# Patient Record
Sex: MALE | Race: BLACK OR AFRICAN AMERICAN | NOT HISPANIC OR LATINO | Employment: OTHER
[De-identification: names, ages, dates, MRNs, and addresses within clinical notes are randomized per-mention and may not be internally consistent; named-entity substitution may affect disease eponyms.]

---

## 2023-05-19 ENCOUNTER — TRANSCRIBE ORDERS (OUTPATIENT)
Dept: OTHER | Age: 68
End: 2023-05-19

## 2023-05-19 DIAGNOSIS — E11.9 TYPE 2 DIABETES MELLITUS WITHOUT COMPLICATION (H): ICD-10-CM

## 2023-05-19 DIAGNOSIS — H52.4 PRESBYOPIA: Primary | ICD-10-CM

## 2023-05-19 DIAGNOSIS — Z86.73 HISTORY OF STROKE: ICD-10-CM

## 2023-09-18 ENCOUNTER — THERAPY VISIT (OUTPATIENT)
Dept: OCCUPATIONAL THERAPY | Facility: CLINIC | Age: 68
End: 2023-09-18
Attending: OPTOMETRIST
Payer: COMMERCIAL

## 2023-09-18 DIAGNOSIS — I63.9 STROKE (H): ICD-10-CM

## 2023-09-18 DIAGNOSIS — H52.4 PRESBYOPIA: Primary | ICD-10-CM

## 2023-09-18 PROCEDURE — 97165 OT EVAL LOW COMPLEX 30 MIN: CPT | Mod: GO | Performed by: OCCUPATIONAL THERAPIST

## 2023-09-18 PROCEDURE — 97535 SELF CARE MNGMENT TRAINING: CPT | Mod: GO | Performed by: OCCUPATIONAL THERAPIST

## 2023-09-18 NOTE — PROGRESS NOTES
OCCUPATIONAL THERAPY EVALUATION  Type of Visit: Evaluation    See electronic medical record for Abuse and Falls Screening details.    Subjective      Presenting condition or subjective complaint: vision  Date of onset: 23    Relevant medical history: -- (PMH: Presbyopia,stroke 2019 with L sided visual deficits, DM, PE, retinal detachment surgery, etc.)   Dates & types of surgery:      Prior diagnostic imaging/testing results:       Prior therapy history for the same diagnosis, illness or injury: No      Prior Level of Function - prior to stroke in 2019:  Transfers: Independent  Ambulation: Independent  ADL: Independent  IADL: Driving, Finances, Housekeeping, Laundry, Meal preparation, Medication management, Work    Living Environment  Social support: With a significant other or spouse (s/o Niru who is also his PCA stays sometimes)   Type of home: Apartment/condo   Stairs to enter the home:         Ramp:     Stairs inside the home:         Help at home: Self Cares (home health aide/personal care attendant, family, etc); Assist for driving and community activities; Home management tasks (cooking, cleaning); Medication and/or finances  Equipment owned:       Employment: No [employer] and other [employer]", etc.;  of [employer] in [place]; president of [employer]; from [facility]; worked up until he had his stroke  Hobbies/Interests: can't do anymore since stroke,would like to be more active and being able to speak motivationally again, etc.    Patient goals for therapy: read - especially his bible, be able to get in front of people again and speak (he is a )    Pain assessment: Pain present  Location: R foot/Ratin/10  Location: tooth pain/Ratin/10     Objective   LOW VISION EVALUATION  ADDITIONAL HISTORY:  Current Responsibilities - IADLS priot to stroke: Driving, Finances, Housekeeping, Laundry, Meal preparation, Medication  "management, Work    Others present at visit: Spouse / significant other    COGNITIVE/BEHAVIORAL:  Communication: Intact  Cognitive Status: Oriented to person, place and time  Behavior: Appropriate    Physical Status/Equipment   Physical Status: s/o guiding patient with elbow/hand; needing some help with ADLs as well - gets clothes turned around, etc.  Mobility Equipment Used: None, also has a walker - uses when s/o busy, otherwise relies on s/o to help with mobility (hand hold)  Bathing ADL Equipment Used:  none  Toileting ADL Equipment Used:  none    VISUAL REPORT:  Functional complaints: Avocational tasks, Homemaking, Leisure, Reading, Safety in mobility, Work related tasks, Writing  Visual Complaints: Constricted visual fields right eye, Constricted visual fields left eye, Visual fatigue, Difficulty maintaining focus, Light sensitivity  Kurt Bonnet Symptoms? No   Magnifiers and Low Vision AE owned:  has one but not using  Reading Glasses: Bifocal (lined) - not liking much  Power per MD report:  +2.75  Technology: Smart phone, not using computer much after stroke - harder, would like to    LIGHTING AND GLARE:  Is your lighting adequate? Yes at home  Is glare a problem? Yes outdoors  Are you satisfied with your sunglasses? Yes   Sunglass Details:  medium and dark gray    VISUAL ACUITY:  Distance Acuity Right Eye: Per recent MD report, 20/200  Distance Acuity Left Eye: Per recent MD report, 20/50 -2  Distance Acuity Both Eyes Together:  not provided  Near Visual Acuity:  see below    CONTRAST SENSITIVITY:  Contrast Sensitivity (score/25): 22/25 (cues to look all the way to the left)    PREFERRED RETINAL LOCUS:  Right/Left Eye:  see below - central visual field    MN Read  Smallest Print Size Read: at 16\" and without Rx (no Rx now and not sure of SVR OTR power): .8M ambient light, .5M (-3) with cool, bright task light, at any distance (~10\") and with +2.5 readers: .4M  Critical Print Size: inconsistent throughout, " likely closest to .6M with task light  Words per minute at critical print size: 67wpm    Visual Skills for Reading  NT/to be assessed    Visual Field:  Central Visual Field: Abnormal  Central Visual Field Screen Used: Clock Face, Red Dot Confrontation Test, clock face: patient reported able to see all; Red Dot: missing targets in all of L visual field, saw both targets (superior/inferior) in center  Peripheral Visual Field: Further testing recommended  Peripheral Visual Field Screen Used:  N/A    Visual Attention: Further testing recommended    SRAFVP Scores:  Relevant Measures: 33  Composite Score: 37  Percentage of Disability: 43.94    Assessment & Plan   CLINICAL IMPRESSIONS  Medical Diagnosis: Presbyopia (H52.4)  - Primary, Type 2 diabetes mellitus without complication (H) (E11.9), History of stroke (Z86.73)    Treatment Diagnosis: decreased ADL/IADL independence    Impression/Assessment: Pt is a 67 year old male presenting to Occupational Therapy due to low vision.  The following significant findings have been identified: Impaired mobility and Impaired visual perception.  These identified deficits interfere with their ability to perform self care tasks, work tasks, recreational activities, household chores, driving , household mobility, community mobility, medication management, financial management,  yard work, meal planning and preparation, and community or volunteer activities as compared to previous level of function.     Clinical Decision Making (Complexity):  Assessment of Occupational Performance: 5 or more Performance Deficits  Occupational Performance Limitations: bathing/showering, dressing, feeding, functional mobility, driving and community mobility, health management and maintenance, home establishment and management, meal preparation and cleanup, shopping, work, volunteer activities, and leisure activities  Clinical Decision Making (Complexity): Low complexity    PLAN OF CARE  Treatment  Interventions:  Interventions: Self-Care/Home Management, Therapeutic Activity    Long Term Goals   OT Goal 1  Goal Identifier: Near Vision  Goal Description: Patient will demonstrate 3 pieces of adaptive equipment and/or adaptive techniques in regards to scanning, magnification, and glare for increased ADL/IADL independence with near vision tasks (reading, meal prep, medication management, writing).  Rationale: In order to maximize safety and independence with performance of self-care activities;In order to safely and appropriately apply compensatory strategies with ADL/IADL performance  Target Date: 03/14/24  OT Goal 2  Goal Identifier: Environmental modifications  Goal Description: Patient will demonstrate and/or verbalize 3 environmentally-based ADL [id number] to improve visual-based ADL/IADL activities.  Rationale: In order to maximize safety and independence with performance of self-care activities;In order to safely and appropriately apply compensatory strategies with ADL/IADL performance  Target Date: 03/14/24  OT Goal 3  Goal Identifier: Resource Education  Goal Description: Patient will verbalize awareness of 2 community resources for increased ADL/IADL completion.  Rationale: In order to maximize safety and independence with performance of self-care activities  Target Date: 03/14/24  OT Goal 4  Goal Identifier: Distance Viewing and safe functional mobility  Goal Description: Patient will demonstrate increased independence in distance viewing and functional mobility/navigation for safety and leisure during ADL/IADLs through independent use of at least one adaptive technique or AE.  Rationale: In order to maximize safety and independence with performance of self-care activities;In order to safely and appropriately apply compensatory strategies with ADL/IADL performance  Target Date: 03/14/24      Frequency of Treatment: 1x/week, decreasing frequency as indicated  Duration of Treatment: 6 months (extended  due to potential scheduling conflicts)     Recommended Referrals to Other Professionals:  to be determined, reports has therapy after his stroke, but not vision  Education Assessment: Learner/Method: Patient;Caregiver;Significant Other;Listening  Education Comments: scanning strategies (look to the left) for near and extrapersonal space; large print best with good task lighting for now; +2.50 readers appeared best for clarity with reading; educated that there are Visual field requirements for state of [place] - not sure if he meets that but this is most important indicator to start to see his chances of returning to community mobility/operating motor vehicle     Risks and benefits of evaluation/treatment have been explained.   Patient/Family/caregiver agrees with Plan of Care.     Evaluation Time:    OT Eval, Low Complexity Minutes (49246): 40    Signing Clinician: ANAHI Sanchez [facility]                                                                                   OUTPATIENT OCCUPATIONAL THERAPY      PLAN OF TREATMENT FOR OUTPATIENT REHABILITATION   Patient's Last Name, First Name, KBBolivarSUMMERXavier Hogue YOB: 1955   Provider's Name   [facility]   Medical Record No.  [id number]     Onset Date: 05/19/23 Start of Care Date: 09/18/23     Medical Diagnosis:  Presbyopia (H52.4)  - Primary, Type 2 diabetes mellitus without complication (H) (E11.9), History of stroke (Z86.73)      OT Treatment Diagnosis:  decreased ADL/IADL independence Plan of Treatment  Frequency/Duration:1x/week, decreasing frequency as indicated/6 months (extended due to potential scheduling conflicts)    Certification date from 09/18/23   To 12/16/23        See note for plan of treatment details and functional goals     Darcie Hess OT                         I CERTIFY THE NEED FOR THESE SERVICES FURNISHED UNDER        THIS PLAN OF TREATMENT AND WHILE UNDER MY CARE  .             Physician Signature               Date    X_____________________________________________________                    Referring Provider:  Jd Ruffin      Initial Assessment  See Epic Evaluation- 09/18/23

## 2024-01-10 ENCOUNTER — THERAPY VISIT (OUTPATIENT)
Dept: OCCUPATIONAL THERAPY | Facility: CLINIC | Age: 69
End: 2024-01-10
Attending: OPTOMETRIST
Payer: COMMERCIAL

## 2024-01-10 DIAGNOSIS — H52.4 PRESBYOPIA: Primary | ICD-10-CM

## 2024-01-10 PROCEDURE — 97535 SELF CARE MNGMENT TRAINING: CPT | Mod: GO | Performed by: OCCUPATIONAL THERAPIST

## 2024-01-10 NOTE — PROGRESS NOTES
01/10/24 0500   Appointment Info   Treating Provider Darcie Hess, OTR/L   Total/Authorized Visits 2/10 - [facility] [place]   Medical Diagnosis Presbyopia (H52.4)  - Primary, Type 2 diabetes mellitus without complication (H) (E11.9), History of stroke (Z86.73)   OT Tx Diagnosis decreased ADL/IADL independence   Precautions/Limitations falls due to low vision and possibly balance as well   Quick Add  Certification   Progress Note/Certification   Start Of Care Date 09/18/23   Onset of Illness/Injury or Date of Surgery 05/19/23   Therapy Frequency 1x/week, decreasing frequency as indicated   Predicted Duration 6 months (extended due to potential scheduling conflicts)   Certification date from 12/17/23   Certification date to 03/14/24   Progress Note Due Date 03/14/24   Progress Note Completed Date 09/18/23   Goals   OT Goals 1;2;3;4   OT Goal 1   Goal Identifier Near Vision   Goal Description Patient will demonstrate 3 pieces of adaptive equipment and/or adaptive techniques in regards to scanning, magnification, and glare for increased ADL/IADL independence with near vision tasks (reading, meal prep, medication management, writing).   Rationale In order to maximize safety and independence with performance of self-care activities;In order to safely and appropriately apply compensatory strategies with ADL/IADL performance   Target Date 03/14/24   OT Goal 2   Goal Identifier Environmental modifications   Goal Description Patient will demonstrate and/or verbalize 3 environmentally-based ADL [id number] to improve visual-based ADL/IADL activities.   Rationale In order to maximize safety and independence with performance of self-care activities;In order to safely and appropriately apply compensatory strategies with ADL/IADL performance   Target Date 03/14/24   OT Goal 3   Goal Identifier Resource Education   Goal Description Patient will verbalize awareness of 2 community resources for increased ADL/IADL  completion.   Rationale In order to maximize safety and independence with performance of self-care activities   Target Date 03/14/24   OT Goal 4   Goal Identifier Distance Viewing and safe functional mobility   Goal Description Patient will demonstrate increased independence in distance viewing and functional mobility/navigation for safety and leisure during ADL/IADLs through independent use of at least one adaptive technique or AE.   Rationale In order to maximize safety and independence with performance of self-care activities;In order to safely and appropriately apply compensatory strategies with ADL/IADL performance   Target Date 03/14/24   Subjective Report   Subjective Report Patient got new glasses since last seen but still can't read small print. Only wears them when trying to read small print and needs to hold them up some to look through the bifocal. Also asking about driving - if he could drive with his current vision.   Objective Measures   Objective Measures Objective Measure 1;Objective Measure 2   Objective Measure 1   Objective Measure Peripheral Visual field via Bernell Vision Disc   Details R eye: 85 degrees temporal visual and ~5 degrees nasal visual field, L eye: ~5 degrees temporal visual field and 50 degrees nasal visual field. Results suggest L visual field deficit.   Objective Measure 2   Objective Measure MNRead   Details with refraction: using ambient lighting: Smallest print size read: .5 M; with task light on chart (warm and dim): .4M with 2 errors; Critical Print size: .8M; WPM at critical print size: 150;  Preferred print size: 1.3M.  Normal reading speed for normal reader: 150-200 wpm out loud.   Treatment Interventions (OT)   Interventions Self Care/Home Management   Self Care/Home Management   Self-Care/Home Mgmt/ADL, Compensatory, Meal Prep Minutes (22531) 61 Minutes   Self Care 1 Adaptive equipment and adapted strategies to maximize visual based ADLs/IADLs   Skilled Intervention  Re-test of MNREad with new Rx - see above. See visual field screening above - educated patient and caregiver in results and application to daily skills. Please see education section below for details of all areas of education completed today including education in AE and adapted techniques to increase visibility for ADL/IADLs.  The most successful techniques and AE are listed below.  Patient demonstrated and/or verbalized use of all of the adapated techniques and AE below via teach back with min to mod cues after increased time/practice.   Patient Response/Progress progress in goals 1,2,3   Education   Learner/Method Patient;Caregiver;Significant Other;Listening   Education Comments educated in and trialed numerous suarez of readers and magnifiers, etc.: +3.0 readers optimal for clarity with reading - large print best but able to read .4M print size on MNRead, +4.0 optimal for very small print - .25M on MNRead chart (but shorter focal distance), 3x stand magnifier successful with his bifocals and lap desk; focal distance with all AE and glasses; ergonomics with low vision and AE; educated that there are Visual field requirements for state of [place] - per screen today, not very likely he has enough visual field to drive but strongly recommend he talk to his eye MD; [employer] and [employer] for readers, etc.; collaboration and education in POC, goals, etc. - all goals remain appropriate;  Look into Anti-slip Eyeglass nose pads for your glasses to help them stay up and be able to use your bifocal without having to lift them up   Plan   Home program anti-slip nose pads for glasses, +3.0 readers, consider lap desk   Updates to plan of care wants to do more motivational speaking and read bible, drive   Plan for next session (L VFD - appears complete, per MD L eye better [facility] but patient says R eye best); check hw; close SBA or s/o guides him; do Pepper Test?? (1.6M or 2M size) - low vision needs most focus; DV and  SC for baseline; start Simon sheets for near scanning; *cont. AE for reading (show desktop CCTV, 3x stand magnifier was successful); SSB referral?!!; HH magnification; tech adaptations (cell ph, computer - hasn't done in a long time); lighting; (22/25 CSF for contrast); dark colors/clothes; help more in kitchen (bump dots for microwave and other if he'll get back to using stove); TV AE; PBS and writing; ed. in extrapersonal scanning strategies and more DV and SC (close SBA/CGA); O&M?   Total Session Time   Timed Code Treatment Minutes 61   Total Treatment Time (sum of timed and untimed services) 61         [facility]                                                                                   OUTPATIENT OCCUPATIONAL THERAPY    PLAN OF TREATMENT FOR OUTPATIENT REHABILITATION   Patient's Last Name, First Name, KBBolivarSUMMERXavier Hogue YOB: 1955   Provider's Name   [facility]   Medical Record No.  [id number]     Onset Date: 05/19/23 Start of Care Date: 09/18/23     Medical Diagnosis:  Presbyopia (H52.4)  - Primary, Type 2 diabetes mellitus without complication (H) (E11.9), History of stroke (Z86.73)      OT Treatment Diagnosis:  decreased ADL/IADL independence Plan of Treatment  Frequency/Duration:1x/week, decreasing frequency as indicated/6 months (extended due to potential scheduling conflicts)    Certification date from 12/17/23   To 03/14/24        See note for plan of treatment details and functional goals.  Updates to goals will be in formal progress note - written at 10th visit or discharge, whichever comes sooner.  Patient only seen for 1 visit beyond evaluation thus far.    Darcie Hess, OT                         I CERTIFY THE NEED FOR THESE SERVICES FURNISHED UNDER        THIS PLAN OF TREATMENT AND WHILE UNDER MY CARE .             Physician Signature                Date    X_____________________________________________________                  Referring Provider:  Jd Ruffin    Initial Assessment  See Epic Evaluation- 09/18/23

## 2024-02-12 ENCOUNTER — THERAPY VISIT (OUTPATIENT)
Dept: OCCUPATIONAL THERAPY | Facility: CLINIC | Age: 69
End: 2024-02-12
Attending: OPTOMETRIST
Payer: COMMERCIAL

## 2024-02-12 DIAGNOSIS — H52.4 PRESBYOPIA: Primary | ICD-10-CM

## 2024-02-12 PROCEDURE — 97535 SELF CARE MNGMENT TRAINING: CPT | Mod: GO | Performed by: OCCUPATIONAL THERAPIST

## 2024-06-03 ENCOUNTER — THERAPY VISIT (OUTPATIENT)
Dept: OCCUPATIONAL THERAPY | Facility: CLINIC | Age: 69
End: 2024-06-03
Attending: OPTOMETRIST
Payer: COMMERCIAL

## 2024-06-03 DIAGNOSIS — H52.4 PRESBYOPIA: Primary | ICD-10-CM

## 2024-06-03 PROCEDURE — 97535 SELF CARE MNGMENT TRAINING: CPT | Mod: GO | Performed by: OCCUPATIONAL THERAPIST

## 2024-06-03 NOTE — PROGRESS NOTES
06/03/24 0500   Appointment Info   Treating Provider Darcie Hess OTR/L   Total/Authorized Visits 4/10 - [facility]   Medical Diagnosis Presbyopia (H52.4)  - Primary, Type 2 diabetes mellitus without complication (H) (E11.9), History of stroke (Z86.73)   OT Tx Diagnosis decreased ADL/IADL independence   Precautions/Limitations falls due to low vision and possibly balance as well   Quick Add  Certification   Progress Note/Certification   Start Of Care Date 09/18/23   Onset of Illness/Injury or Date of Surgery 05/19/23   Therapy Frequency 1x/week, decreasing frequency as indicated   Predicted Duration 6 months (extended due to potential scheduling conflicts)   Certification date from 03/15/24   Certification date to 06/12/24   Progress Note Due Date 11/28/24   Progress Note Completed Date 09/18/23   Goals   OT Goals 1;2;3;4   OT Goal 1   Goal Identifier Near Vision   Goal Description Patient will demonstrate 3 pieces of adaptive equipment and/or adaptive techniques in regards to scanning, magnification, and glare for increased ADL/IADL independence with near vision tasks (reading, meal prep, medication management, writing).   Rationale In order to maximize safety and independence with performance of self-care activities;In order to safely and appropriately apply compensatory strategies with ADL/IADL performance   Goal Progress Good progress towards goal; goal continues to be appropriate until fully met. Education provided in so far: AE for reading (reading guide, typoscope, 3x illuminated stand magnifier and CCTV  successful, magnifier angle on cell phone, etc.); also educated in scanning strategies to the left   Target Date 11/28/24   OT Goal 2   Goal Identifier Environmental modifications   Goal Description Patient will demonstrate and/or verbalize 3 environmentally-based ADL [id number] to improve visual-based ADL/IADL activities.   Rationale In order to maximize safety and independence with  performance of self-care activities;In order to safely and appropriately apply compensatory strategies with ADL/IADL performance   Goal Progress Goal not addressed as much due to other focus areas.  Education provided thus far: different color contrast options for CCTV's, etc. due to some light sensitivity - prefers yellow/black.   Target Date 11/28/24   OT Goal 3   Goal Identifier Resource Education   Goal Description Patient will verbalize awareness of 2 community resources for increased ADL/IADL completion.   Rationale In order to maximize safety and independence with performance of self-care activities   Goal Progress Good progress towards goal; goal continues to be appropriate until fully met. Education provided in so far: [employer], [employer]   Target Date 11/28/24   OT Goal 4   Goal Identifier Distance Viewing and safe functional mobility   Goal Description Patient will demonstrate increased independence in distance viewing and functional mobility/navigation for safety and leisure during ADL/IADLs through independent use of at least one adaptive technique or AE.   Rationale In order to maximize safety and independence with performance of self-care activities;In order to safely and appropriately apply compensatory strategies with ADL/IADL performance   Goal Progress Good progress towards goal; goal continues to be appropriate until fully met. Education provided in so far: extrapersonal scanning strategies to increase functional mobility   Target Date 11/28/24   Subjective Report   Subjective Report Patient reports he has been reading his bible and it's going better.  Still hasn't gotten +3.0 glasses - not sure what power his glasses are.  Reports did his homework.  Feels like he's paying more attention to his left - while pushing a grocery cart for example.  Asking about driving - feels like he's improving.   Objective Measures   Objective Measures Objective Measure 1;Objective  Measure 2;Objective Measure 3;Objective Measure 4   Objective Measure 1   Objective Measure Peripheral Visual field via Bernell Vision Disc   Details 1/10/24: R eye: 85 degrees temporal visual and ~5 degrees nasal visual field, L eye: ~5 degrees temporal visual field and 50 degrees nasal visual field. Results suggest L visual field deficit.   Objective Measure 2   Objective Measure MNRead   Details 1/10/24: with refraction: using ambient lighting: Smallest print size read: .5 M; with task light on chart (warm and dim): .4M with 2 errors; Critical Print size: .8M; WPM at critical print size: 150;  Preferred print size: 1.3M.  Normal reading speed for normal reader: 150-200 wpm out loud.   Objective Measure 3   Objective Measure Visual Skills for Reading Test (Pepper Test)   Details 2/12/24: Used 2.0M font size using Rx bifocals and cool task light.  Patient read the letters and words with 73% accuracy and read 49 wpm (average wpm for adults is 150-200 reading out loud).  Pattern of errors included: misidentification (mostly consistent with L field deficit, however, there were R sided errors as well), repetition (re-read parts or all of 5 of the lines), spells word (needed re-direction to say the words) and line skip (skipped 2 whole lines and 1/2 of 4 lines - 3 on the right and 1 on the L)   Objective Measure 4   Objective Measure Dynavision   Details 2/12/24: (All completed seated and majority of time using R UE only due to slightly decreased strength/coordination of L UE): Mode A: 36 hits (reaction time per quadrant per secl: 1.7 upper L, 1.6 upper R, 1.8 lower L and 1.5 lower R); trail 2: 46 hits and ~.3 sec. slower again lower L; educated in numerous strategies to compensate for L visual deficit and then completed a 3rd trial: 44 hits and quadrants nearly equal - improved   Treatment Interventions (OT)   Interventions Self Care/Home Management   Self Care/Home Management   Self-Care/Home Mgmt/ADL, Compensatory,  "Meal Prep Minutes (96021) 58 Minutes   Self Care 1 Adaptive equipment and adapted strategies to maximize visual based ADLs/IADLs   Skilled Intervention Please see education section below for details of all areas of education completed today including education in AE and adapted techniques to increase visibility for ADL/IADLs. The most successful techniques and AE are listed below. Patient demonstrated and/or verbalized use of all of the adapated techniques and AE below via teach back with min to mod cues (more cues for technology) after increased time/practice. Custom handout issued re: education completed today and for future reference.   Patient Response/Progress progress in goals 1,2,4   Education   Learner/Method Patient;Caregiver;Significant Other;Listening   Education Comments AE and adaptations for reading:  his reading glasses are better than the +3.0 as determined today, reading guide helpful; Magnifiers: 3x stand magnifier with light successful again, ergonomics/Lap desk brings it closer and need to use with the stand magnifier, CCTV - [employer] - educated in and trialed 8\"CCTV today and verbal education in other sizes/types - preferred yellow/black color contrast; cell phone adaptations: Stubmatic angle (like a magnifier) - educated in all features including color contrast; pop socket would be helpful to have if you use the Stubmatic angle more, bold font successful; [employer] as a resource to purchase AE; [employer] [facility] [employer] ([facility]) and services they offer; visual skills necessary to operate a motor vehicle   Plan   Home program NEW: WeZoom angle, get a pop socket, consider CCTV (likely no - [place]); CONT: reading guide and *typoscope! issued for reading large print bible; 4M Simon scanning sheets; circular scanning extrapersonal; anti-slip nose pads for glasses, +3.0 readers   Updates to plan of care wants to do more motivational speaking and read bible, drive   Plan for next session " "(L VFD - appears complete, per MD L eye better [place] but patient says R eye best); (did spatial inattn ed.); check hw, close SBA or s/o guides him; - low vision needs most focus; more DV prn - not necessary and SC for 1st time with circular (and need to teach lighthouse); cont. Simon sheets for near scanning; **SSB referral - educated in 6/3 for magnifiers and O&M?; HH magnification (on top of Wezoom mag); tech adaptations (cell ph - did font and we zoom so far - strong accent hard for voice, computer - hasn't done in a long time); lighting; (22/25 CSF for contrast); dark colors/clothes; help more in kitchen (bump dots for microwave and other if he'll get back to using stove); TV AE; PBS and writing; more trial of AE for cont. reading prn (showed 8\" CCTV, 3x stand magnifier - both successful); re-do Pepper Test (2M size) at last visit   Total Session Time   Timed Code Treatment Minutes 58   Total Treatment Time (sum of timed and untimed services) 58         [facility]                                                                                   OUTPATIENT OCCUPATIONAL THERAPY    PLAN OF TREATMENT FOR OUTPATIENT REHABILITATION   Patient's Last Name, First Name, Swapnil Weinbergdanna AGUIRRE YOB: 1955   Provider's Name   [facility]   Medical Record No.  [id number]     Onset Date: 05/19/23 Start of Care Date: 09/18/23     Medical Diagnosis:  Presbyopia (H52.4)  - Primary, Type 2 diabetes mellitus without complication (H) (E11.9), History of stroke (Z86.73)      OT Treatment Diagnosis:  decreased ADL/IADL independence Plan of Treatment  Frequency/Duration:1x/week, decreasing frequency as indicated/6 months (extended due to potential scheduling conflicts)    Certification date from (P) 03/15/24   To (P) 06/12/24        See note for plan of treatment details and functional goals     Darcie Hess, OT                         I CERTIFY THE NEED FOR " THESE SERVICES FURNISHED UNDER        THIS PLAN OF TREATMENT AND WHILE UNDER MY CARE .             Physician Signature               Date    X_____________________________________________________                  Referring Provider:  Jd Ruffin    Initial Assessment  See Epic Evaluation- 09/18/23          PLAN  Continue therapy per current plan of care.    Beginning/End Dates of Progress Note Reporting Period:  09/18/23 to 06/03/2024    Referring Provider:  Jd Ruffin

## 2024-06-11 ENCOUNTER — TRANSCRIBE ORDERS (OUTPATIENT)
Dept: OTHER | Age: 69
End: 2024-06-11

## 2024-06-11 DIAGNOSIS — I63.9 STROKE (H): Primary | ICD-10-CM

## 2024-06-11 DIAGNOSIS — H33.20 RETINAL DETACHMENT: ICD-10-CM

## 2024-08-12 ENCOUNTER — THERAPY VISIT (OUTPATIENT)
Dept: OCCUPATIONAL THERAPY | Facility: CLINIC | Age: 69
End: 2024-08-12
Attending: OPTOMETRIST
Payer: COMMERCIAL

## 2024-08-12 DIAGNOSIS — I63.9 STROKE (H): ICD-10-CM

## 2024-08-12 DIAGNOSIS — H52.4 PRESBYOPIA: Primary | ICD-10-CM

## 2024-08-12 PROCEDURE — 97535 SELF CARE MNGMENT TRAINING: CPT | Mod: GO | Performed by: OCCUPATIONAL THERAPIST

## 2024-08-12 NOTE — PROGRESS NOTES
08/12/24 0500   Appointment Info   Treating Provider Darcie Hess, OTR/L   Total/Authorized Visits 1/10 - [facility]   Medical Diagnosis Presbyopia (H52.4)  - Primary, Type 2 diabetes mellitus without complication (H) (E11.9), History of stroke (Z86.73)   OT Tx Diagnosis decreased ADL/IADL independence   Precautions/Limitations falls due to low vision and possibly balance as well   Progress Note/Certification   Start Of Care Date 09/18/23   Onset of Illness/Injury or Date of Surgery 05/19/23   Therapy Frequency 1x/week, decreasing frequency as indicated   Predicted Duration 6 months (extended due to potential scheduling conflicts)   Certification date from 06/13/24   Certification date to 09/10/24   Progress Note Due Date 11/28/24   Progress Note Completed Date 06/03/24   Goals   OT Goals 1;2;3;4   OT Goal 1   Goal Identifier Near Vision   Goal Description Patient will demonstrate 3 pieces of adaptive equipment and/or adaptive techniques in regards to scanning, magnification, and glare for increased ADL/IADL independence with near vision tasks (reading, meal prep, medication management, writing).   Rationale In order to maximize safety and independence with performance of self-care activities;In order to safely and appropriately apply compensatory strategies with ADL/IADL performance   Goal Progress Good progress towards goal; goal continues to be appropriate until fully met. Education provided in so far: AE for reading (reading guide, typoscope, 3x illuminated stand magnifier and CCTV  successful, magnifier angle on cell phone, etc.); also educated in scanning strategies to the left   Target Date 11/28/24   OT Goal 2   Goal Identifier Environmental modifications   Goal Description Patient will demonstrate and/or verbalize 3 environmentally-based ADL [id number] to improve visual-based ADL/IADL activities.   Rationale In order to maximize safety and independence with performance of self-care  "activities;In order to safely and appropriately apply compensatory strategies with ADL/IADL performance   Goal Progress Goal not addressed as much due to other focus areas.  Education provided thus far: different color contrast options for CCTV's, etc. due to some light sensitivity - prefers yellow/black.   Target Date 11/28/24   OT Goal 3   Goal Identifier Resource Education   Goal Description Patient will verbalize awareness of 2 community resources for increased ADL/IADL completion.   Rationale In order to maximize safety and independence with performance of self-care activities   Goal Progress Good progress towards goal; goal continues to be appropriate until fully met. Education provided in so far: [employer], [employer]   Target Date 11/28/24   OT Goal 4   Goal Identifier Distance Viewing and safe functional mobility   Goal Description Patient will demonstrate increased independence in distance viewing and functional mobility/navigation for safety and leisure during ADL/IADLs through independent use of at least one adaptive technique or AE.   Rationale In order to maximize safety and independence with performance of self-care activities;In order to safely and appropriately apply compensatory strategies with ADL/IADL performance   Goal Progress Good progress towards goal; goal continues to be appropriate until fully met. Education provided in so far: extrapersonal scanning strategies to increase functional mobility   Target Date 11/28/24   Subjective Report   Subjective Report Patient reports some eye socket pain in L eye and eyes tear at times.  Getting stronger and not needing to hold on to anyone when walking anymore.  Asking about being able to drive.  Has been reading well.  Reports was told he didn't have a stroke (as stated on order for OT), but he had \"what's like shaken baby syndrome\" with a fall.  Patient and s/o asking a lot of questions surrounding this and his vision. " "  Objective Measures   Objective Measures Objective Measure 1;Objective Measure 2;Objective Measure 3;Objective Measure 4;Objective Measure 5;Objective Measure 6   Objective Measure 1   Objective Measure Peripheral Visual field via Bernell Vision Disc   Details 8/12/24: R eye: 75 degrees temporal visual and 0 degrees nasal visual field, L eye: ~5 degrees temporal visual field and 50 degrees nasal visual field - vey similar to screening back in January. Results suggest L visual field deficit and no notable change.   Objective Measure 2   Objective Measure MNRead   Details 8/12/24: brief re-assessment for smallest print size read: .5M at 16\" ambient light without correction at all (mostly just using readers for now)   Objective Measure 3   Objective Measure Visual Skills for Reading Test (Pepper Test)   Details 2/12/24: Used 2.0M font size using Rx bifocals and cool task light.  Patient read the letters and words with 73% accuracy and read 49 wpm (average wpm for adults is 150-200 reading out loud).  Pattern of errors included: misidentification (mostly consistent with L field deficit, however, there were R sided errors as well), repetition (re-read parts or all of 5 of the lines), spells word (needed re-direction to say the words) and line skip (skipped 2 whole lines and 1/2 of 4 lines - 3 on the right and 1 on the L)   Objective Measure 4   Objective Measure Dynavision   Details 2/12/24: (All completed seated and majority of time using R UE only due to slightly decreased strength/coordination of L UE): Mode A: 36 hits (reaction time per quadrant per secl: 1.7 upper L, 1.6 upper R, 1.8 lower L and 1.5 lower R); trail 2: 46 hits and ~.3 sec. slower again lower L; educated in numerous strategies to compensate for L visual deficit and then completed a 3rd trial: 44 hits and quadrants nearly equal - improved   Objective Measure 5   Objective Measure Scancourse   Details 8/12/24: On 65 foot scancourse with targets " high/medium/low: 1st trial: 2/9 on L and 6/11 targets noted on R in 29 sec.  Educated patient in results and instructed patient to slow down and focus on accuracy 1st. 2nd trial: 7/11 on L and 3/9 noted on R in 28 sec.  Also educated in scanning strategies: lighthouse and circular. Then completed more trials: 5/9 on L and 7/11 on R in 29 sec. and 8/11 on L and 7/9 on R in 39 sec. All trials below functional limits for accuracy and speed (WFLS is 90% accuracy and <35-40 seconds). Improved with increased repetition/practice and education/use of scanning strategies.  Despite explanation, patient likely misunderstood part of the directions and for the 1st 2 trials, didn't know he could turn his head as instructed.   Objective Measure 6   Objective Measure Intermediate distance acuity   Details 24: via Low Vision Loudeyeumbers chart tested at 1 meter (no correction - doesn't wear for distance): R eye 20/20 -1, L eye 20//20 -2 and B eyes: 20/20 -1.   Treatment Interventions (OT)   Interventions Self Care/Home Management   Self Care/Home Management   Self-Care/Home Mgmt/ADL, Compensatory, Meal Prep Minutes (18877) 61 Minutes   Self Care 1 Adaptive equipment and adapted strategies to maximize visual based ADLs/IADLs   Skilled Intervention Numerous visual screens completed - see objective measures above.  Patient and s/o educted at length in results of all and how affects ADL/IADLS.  Patient and s/o also educated at length in the followin) nasal versus peripheral field and that deficit affects both eyes (used taped glasses that occluded visual field deficit for s/o to trial and understand further as well as patient); 2) normal visual field and visual field amount required for community mobility in state of [place]; 3) concept of perceptual completion; 4) continued education in scanning strategies for extrapersonal space primarily.See education section below for additional treatment today. Educated in OTR knowledge of visual  deficits with brain injury and affect on function.   Patient Response/Progress progress in goals 1,4   Education   Learner/Method Patient;Caregiver;Significant Other;Listening   Education Comments circular and lighthouse scanning; visual skills necessary to operate a motor vehicle   Plan   Home program NEW: lighthouse and circular scanning strategies; CONT: WeZoom angle, get a pop socket, consider CCTV ([place]); reading guide and *typoscope! issued for reading large print bible; 4M Simon scanning sheets; anti-slip nose pads for glasses, +3.0 readers   Updates to plan of care wants to do more motivational speaking   Plan for next session (L VFD - appears complete, per MD L eye better [place] but patient says R eye best);** re-do Pepper Test (1.6M size or 2M as before but [place] better); (did spatial inattn ed.); check hw, more DV! and SC for 2nd time with circular and lighthouse; cont. Simon sheets for near scanning; tech adaptations (cell ph - did font and we zoom so far - strong accent hard for voice, computer - hasn't done in a long time); lighting?; (22/25 CSF for contrast); dark colors/clothes; help more in kitchen (bump dots for microwave prn and other if he'll get back to using stove); TV AE; PBS? and writing prn   Total Session Time   Timed Code Treatment Minutes 61   Total Treatment Time (sum of timed and untimed services) 61         [facility]                                                                                   OUTPATIENT OCCUPATIONAL THERAPY    PLAN OF TREATMENT FOR OUTPATIENT REHABILITATION   Patient's Last Name, First Name, Xavier Weinberg YOB: 1955   Provider's Name   [facility]   Medical Record No.  [id number]     Onset Date: 05/19/23 Start of Care Date: 09/18/23     Medical Diagnosis:  Presbyopia (H52.4)  - Primary, Type 2 diabetes mellitus without complication (H) (E11.9), History of stroke  (Z86.73)      OT Treatment Diagnosis:  decreased ADL/IADL independence Plan of Treatment  Frequency/Duration:1x/week, decreasing frequency as indicated/6 months (extended due to potential scheduling conflicts)    Certification date from 06/13/24   To 09/10/24        See note for plan of treatment details and functional goals     Darcie Hess OT                         I CERTIFY THE NEED FOR THESE SERVICES FURNISHED UNDER        THIS PLAN OF TREATMENT AND WHILE UNDER MY CARE .             Physician Signature               Date    X_____________________________________________________                  Referring Provider:  Jd Ruffin    Initial Assessment  See Epic Evaluation- 09/18/23

## 2024-08-22 ENCOUNTER — THERAPY VISIT (OUTPATIENT)
Dept: OCCUPATIONAL THERAPY | Facility: CLINIC | Age: 69
End: 2024-08-22
Attending: OPTOMETRIST
Payer: COMMERCIAL

## 2024-08-22 DIAGNOSIS — H52.4 PRESBYOPIA: Primary | ICD-10-CM

## 2024-08-22 DIAGNOSIS — I63.9 STROKE (H): ICD-10-CM

## 2024-08-22 PROCEDURE — 97535 SELF CARE MNGMENT TRAINING: CPT | Mod: GO | Performed by: OCCUPATIONAL THERAPIST

## 2024-08-29 ENCOUNTER — THERAPY VISIT (OUTPATIENT)
Dept: OCCUPATIONAL THERAPY | Facility: CLINIC | Age: 69
End: 2024-08-29
Attending: OPTOMETRIST
Payer: COMMERCIAL

## 2024-08-29 DIAGNOSIS — I63.9 STROKE (H): ICD-10-CM

## 2024-08-29 DIAGNOSIS — H52.4 PRESBYOPIA: Primary | ICD-10-CM

## 2024-08-29 PROCEDURE — 97535 SELF CARE MNGMENT TRAINING: CPT | Mod: GO | Performed by: OCCUPATIONAL THERAPIST

## 2024-09-05 ENCOUNTER — THERAPY VISIT (OUTPATIENT)
Dept: OCCUPATIONAL THERAPY | Facility: CLINIC | Age: 69
End: 2024-09-05
Attending: OPTOMETRIST
Payer: COMMERCIAL

## 2024-09-05 DIAGNOSIS — I63.9 CEREBROVASCULAR ACCIDENT (CVA), UNSPECIFIED MECHANISM (H): ICD-10-CM

## 2024-09-05 DIAGNOSIS — H52.4 PRESBYOPIA: Primary | ICD-10-CM

## 2024-09-05 PROCEDURE — 97535 SELF CARE MNGMENT TRAINING: CPT | Mod: GO | Performed by: OCCUPATIONAL THERAPIST

## 2024-09-05 NOTE — PROGRESS NOTES
09/05/24 0500   Appointment Info   Treating Provider Darcie Hess OTR/L   [place]ized [place] 4/10 [place] [facility] [place] Diagnosis Presbyopia (H52.4)  - Primary, Type 2 diabetes mellitus without complication (H) (E11.9), History of stroke (Z86.73)   OT Tx Diagnosis decreased ADL/IADL independence   Precautions/Limitations falls due to low vision and possibly balance as well   Quick Add  Certification   Progress Note/Certification   Start Of Care Date 09/18/23   Onset of Illness/Injury or Date of Surgery 05/19/23   Therapy Frequency 1x/week, decreasing frequency as indicated   Predicted Duration 6 months (extended due to potential scheduling conflicts)   Certification date from 06/13/24   Certification date to 09/10/24   Progress Note Due Date 11/28/24   Progress Note Completed Date 06/03/24   Goals   OT Goals 1;2;3;4   OT Goal 1   Goal Identifier Near Vision   Goal Description Patient will demonstrate 3 pieces of adaptive equipment and/or adaptive techniques in regards to scanning, magnification, and glare for increased ADL/IADL independence with near vision tasks (reading, meal prep, medication management, writing).   Rationale In order to maximize safety and independence with performance of self-care activities;In order to safely and appropriately apply compensatory strategies with ADL/IADL performance   Goal Progress Goal met. Education provided in: AE for reading (reading guide, typoscope, 3x illuminated stand magnifier and CCTV successful, magnifier angle on cell phone, etc.); also educated in scanning strategies to the left.   Target Date 11/28/24   OT Goal 2   Goal Identifier Environmental modifications   Goal Description Patient will demonstrate and/or verbalize 3 environmentally-based ADL [id number] to improve visual-based ADL/IADL activities.   Rationale In order to maximize safety and independence with performance of self-care activities;In order to safely and appropriately apply  "compensatory strategies with ADL/IADL performance   Goal Progress Goal not addressed as much due to other focus areas.  Education provided thus far: different color contrast options for CCTV's, etc. due to some light sensitivity - prefers yellow/black.   Target Date 11/28/24   Date Met 09/05/24   OT Goal 3   Goal Identifier Resource Education   Goal Description Patient will verbalize awareness of 2 community resources for increased ADL/IADL completion.   Rationale In order to maximize safety and independence with performance of self-care activities   Goal Progress Goal met. Education provided in: [employer], [employer] [facility] [employer]   Target Date 11/28/24   Date Met 09/05/24   OT Goal 4   Goal Identifier Distance Viewing and safe functional mobility   Goal Description Patient will demonstrate increased independence in distance viewing and functional mobility/navigation for safety and leisure during ADL/IADLs through independent use of at least one adaptive technique or AE.   Rationale In order to maximize safety and independence with performance of self-care activities;In order to safely and appropriately apply compensatory strategies with ADL/IADL performance   Goal Progress Goal met. Education provided in: extrapersonal scanning strategies to increase functional mobility (circular, lighthouse, scanning for obstacles, etc.).  He continues to require min cues to recall all strategies and to implement effectively - s/o will continue to work on with patient. Much improved.   Target Date 11/28/24   Date Met 09/05/24   Subjective Report   Subjective Report Patient reports he accidently went in to the women's restroom because he missed the \"WO\" on the sign and thought he saw just men.  Patient reports no trouble with writing.   Objective Measures   Objective Measures Objective Measure 1;Objective Measure 2;Objective Measure 3;Objective Measure 4;Objective Measure 5;Objective Measure 6   Objective Measure 1 " "  Objective Measure Peripheral Visual field via [employer]ell Vision Disc   Details 8/12/24: R eye: 75 degrees temporal visual and 0 degrees nasal visual field, L eye: ~5 degrees temporal visual field and 50 degrees nasal visual field - vey similar to screening back in January. Results suggest L visual field deficit and no notable change.   Objective Measure 2   Objective Measure MNRead   Details 8/12/24: brief re-assessment for smallest print size read: .5M at 16\" ambient light without correction at all (mostly just using readers for now)   Objective Measure 3   Objective Measure Visual Skills for Reading Test (Pepper Test)   Details 8/22/24: Using 1.6M font with her reading glasses (OTC, patient not sure of power but they work best): 81% accuracy and read 70 wpm (average wpm for adults is 150-200 reading out loud). Pattern of errors included: misidentification (mostly consistent with L field deficit) and line skip (skipped 3 lines - common with L sided visual deficit); educated pateint in results and re-educated in strategies to increase accuracy and completed a 2nd test (patient request) with different set of letters and words: 96% accuracy and read 52wpm; both much improved from performance on 2/12/24: Used 2.0M font size using Rx bifocals and cool task light.  Patient read the letters and words with 73% accuracy and read 49 wpm (average wpm for adults is 150-200 reading out loud).  Pattern of errors included: misidentification (mostly consistent with L field deficit, however, there were R sided errors as well), repetition (re-read parts or all of 5 of the lines), spells word (needed re-direction to say the words) and line skip (skipped 2 whole lines and 1/2 of 4 lines - 3 on the right and 1 on the L)   Objective Measure 4   Objective Measure Dynavision   Details 9/5/24: completed standing - patient stayed in center today (improved), Mode A: 39 hits with no cues provided - best 1st trial for # of hits (reaction time " per quadrant per sec.: 2.1 upper L, 1.3 upper R, 1.8 lower L and 1.3 lower R); re-educated in extrapersonal scanning strategies (circular and lighthouse scanning) and then completed trial 2: 39 hits and all quadrants fairly equal; Mode A 4 minutes: 166 hits and reaction time all similar except .2 sec. slower in lower L; Mode B 60 sec. (1.2 sec. light speed): 18 hits (accuracy in quadrants ~25% for all except 53% upper R); all are BNLs but improved   Objective Measure 5   Objective Measure Scancourse   Details 9/5/24: On 65 foot scancourse with targets high/medium/low: 1st trial: 7/10 on L and 7/10 targets noted on R in 37 sec.  Educated patient in results and re-educated in scanning strategies and instructed patient to slow down and focus on accuracy 1st. 2nd trial: 7/10 on L and 9/10 noted on R in 42 sec. All trials below functional limits for accuracy and speed (WFLS is 90% accuracy and <35-40 seconds). Improved with increased repetition/practice and education/use of scanning strategies.   Objective Measure 6   Objective Measure Intermediate distance acuity   Details 8/12/24: via Low [employer] chart tested at 1 meter (no correction - doesn't wear for distance): R eye 20/20 -1, L eye 20//20 -2 and B eyes: 20/20 -1.   Treatment Interventions (OT)   Interventions Self Care/Home Management   Self Care/Home Management   Self-Care/Home Mgmt/ADL, Compensatory, Meal Prep Minutes ([id number]) 58 Minutes   Self Care 1 Adaptive equipment and adapted strategies to maximize visual based ADLs/IADLs   Skilled Intervention Scancourse and Dynavision- see above; patient and s/o educated in results, how the activity applies to daily skills and educated extensively further in strategies and importance of using strategies (need to be conscious/intentional) to prevent falls, etc. due to L visual field deficit. Educated in strategies for safety crossing the street, being a crowd, etc. (L, R , L with scanning and crossing), and  educated in HEP working on extrapersonal space (scavenger hunts, simulated DV, etc.). Educated in additional HEP for near (visual apps: Skilled review of Flow Free - able to progress to 6x6 with min cues to attend to L side firstand, Find the Differences - min to mod cues required; Visual Attention - min to mod cues for startign on the L side and for good strategies use - after reminder, able to apply for other tasks)   Patient Response/Progress progress in goals 1,2   Education   Learner/Method Patient;Caregiver;Significant Other;Listening   Education Comments see above and below   Plan   Home program NEW:visual apps, L, R, L before crossing street; CONT: accuracy 1st for reading vs. speed; lighthouse and circular scanning strategies; [email or web address] angle, get a pop socket, reading guide and *typoscope! issued for reading large print bible; 4M Simon scanning sheets; anti-slip nose pads for glasses, +3.0 readers   Plan for next session discharge from OT   Total Session Time   Timed Code Treatment Minutes 58   Total Treatment Time (sum of timed and untimed services) 58     DISCHARGE  Reason for Discharge: Patient has met all goals.    Equipment Issued: N/A    Discharge Plan: Patient to continue home program.    Referring Provider:  Jd Ruffin